# Patient Record
Sex: FEMALE | Race: WHITE | NOT HISPANIC OR LATINO | URBAN - METROPOLITAN AREA
[De-identification: names, ages, dates, MRNs, and addresses within clinical notes are randomized per-mention and may not be internally consistent; named-entity substitution may affect disease eponyms.]

---

## 2023-10-05 ENCOUNTER — LAB REQUISITION (OUTPATIENT)
Dept: LAB | Facility: HOSPITAL | Age: 48
End: 2023-10-05

## 2023-10-05 LAB — SARS-COV-2 RNA RESP QL NAA+PROBE: DETECTED

## 2023-10-05 PROCEDURE — 87635 SARS-COV-2 COVID-19 AMP PRB: CPT

## 2024-03-21 ENCOUNTER — HOSPITAL ENCOUNTER (EMERGENCY)
Facility: HOSPITAL | Age: 49
Discharge: LEFT BEFORE DISCHARGE | End: 2024-03-21

## 2024-03-21 PROCEDURE — 4500999001 HC ED NO CHARGE: Performed by: PHYSICIAN ASSISTANT

## 2025-03-17 ENCOUNTER — APPOINTMENT (OUTPATIENT)
Dept: PLASTIC SURGERY | Facility: CLINIC | Age: 50
End: 2025-03-17

## 2025-03-17 DIAGNOSIS — Z41.1 ENCOUNTER FOR COSMETIC PROCEDURE: Primary | ICD-10-CM

## 2025-03-17 NOTE — PROGRESS NOTES
Division of Plastic & Reconstructive Surgery            Botox injection visit    Stewart Scott   Maxine Singh is a 49 y.o. female who was previously underwent Botox and revision for migraine relief.  Patient presents after injection approximately 3 weeks ago with the dissatisfaction and persistent lateral frontalis motion.  Patient would like this addressed, as well as bilateral crows feet      Objective    There were no vitals filed for this visit.    Gen: interactive and pleasant  Head: NCAT  Eyes: EOMI, PERRLA  Mouth: MMM  Throat: trachea midline  Cor: RRR  Pulm: nonlabored breathing  Abd: s/nt/nd  Neuro: AAOx3  Ext: extremities perfused    There is no height or weight on file to calculate BMI.      Focused exam of the: Frontalis, right frontalis is mobile laterally  Notable wrinkling at the lateral canthus/crows feet    Assessment/Plan       Maxine is a 49 y.o. female who presents for injection of Botox.  Pt presents for botox injection today. Consent obtained. Botox reconstituted per industry standards.    Lot: [id number]  Exp: 10/2026    Forehead/Frontalis: 5  units  Crows Feet: 20 units    Total injected: 25 units       Pt tolerated procedure well. No active bleeding.    -discussed botox, mechanism of action, onset, duration, and complications/risks including asymmetry, lack of muscle paralysis, and spreading causing upper eyelid ptosis,. Patient expressed understanding    Botox Injection    Maxine came today for Botox injection.  I reviewed with her the risks and benefits including ptosis, infection, and bleeding. She has no contraindications. She is on no antibiotics and has no neuromuscular disorders.  Pregnancy is not an issue.     She tolerated the procedure well.  The patient  will return to see me again in three to four months, earlier if there are any problems.     Maxine understands the side effects and risks, as well as the necessity for continued treatment to maintain  improvement.  Additional therapy may be necessary. Call if there are any problems. See diagram for injection sites and dosages.  25 units were used at a concentration of 10 units per 0.1 mL.          I spent 30 minutes with this patient. Greater than 50% of this time was spent in the counselling and/or coordination of care of this patient.  This note was created using voice recognition software and was not corrected for typographical or grammatical errors.    Signature: Willie Soriano MD   Date: 3/17/2025